# Patient Record
Sex: FEMALE | URBAN - NONMETROPOLITAN AREA
[De-identification: names, ages, dates, MRNs, and addresses within clinical notes are randomized per-mention and may not be internally consistent; named-entity substitution may affect disease eponyms.]

---

## 2021-03-03 ENCOUNTER — APPOINTMENT (OUTPATIENT)
Dept: URBAN - NONMETROPOLITAN AREA CLINIC 51 | Age: 71
Setting detail: DERMATOLOGY
End: 2021-03-03

## 2021-03-03 VITALS — HEIGHT: 65 IN | WEIGHT: 278 LBS

## 2021-03-03 DIAGNOSIS — D485 NEOPLASM OF UNCERTAIN BEHAVIOR OF SKIN: ICD-10-CM

## 2021-03-03 DIAGNOSIS — L82.1 OTHER SEBORRHEIC KERATOSIS: ICD-10-CM

## 2021-03-03 DIAGNOSIS — L72.8 OTHER FOLLICULAR CYSTS OF THE SKIN AND SUBCUTANEOUS TISSUE: ICD-10-CM

## 2021-03-03 DIAGNOSIS — L91.8 OTHER HYPERTROPHIC DISORDERS OF THE SKIN: ICD-10-CM

## 2021-03-03 DIAGNOSIS — Z85.828 PERSONAL HISTORY OF OTHER MALIGNANT NEOPLASM OF SKIN: ICD-10-CM

## 2021-03-03 DIAGNOSIS — D18.0 HEMANGIOMA: ICD-10-CM

## 2021-03-03 DIAGNOSIS — L30.9 DERMATITIS, UNSPECIFIED: ICD-10-CM

## 2021-03-03 DIAGNOSIS — L73.8 OTHER SPECIFIED FOLLICULAR DISORDERS: ICD-10-CM

## 2021-03-03 DIAGNOSIS — I78.8 OTHER DISEASES OF CAPILLARIES: ICD-10-CM

## 2021-03-03 PROBLEM — D18.01 HEMANGIOMA OF SKIN AND SUBCUTANEOUS TISSUE: Status: ACTIVE | Noted: 2021-03-03

## 2021-03-03 PROBLEM — D23.62 OTHER BENIGN NEOPLASM OF SKIN OF LEFT UPPER LIMB, INCLUDING SHOULDER: Status: ACTIVE | Noted: 2021-03-03

## 2021-03-03 PROBLEM — D48.5 NEOPLASM OF UNCERTAIN BEHAVIOR OF SKIN: Status: ACTIVE | Noted: 2021-03-03

## 2021-03-03 PROCEDURE — 99213 OFFICE O/P EST LOW 20 MIN: CPT

## 2021-03-03 PROCEDURE — OTHER PRESCRIPTION: OTHER

## 2021-03-03 PROCEDURE — OTHER OBSERVATION: OTHER

## 2021-03-03 PROCEDURE — OTHER COUNSELING: OTHER

## 2021-03-03 PROCEDURE — OTHER PATIENT SPECIFIC COUNSELING: OTHER

## 2021-03-03 RX ORDER — TRIAMCINOLONE ACETONIDE 1 MG/G
CREAM TOPICAL BID PRN
Qty: 1 | Refills: 0 | Status: ERX | COMMUNITY
Start: 2021-03-03

## 2021-03-03 ASSESSMENT — LOCATION SIMPLE DESCRIPTION DERM
LOCATION SIMPLE: RIGHT CHEEK
LOCATION SIMPLE: LEFT UPPER ARM
LOCATION SIMPLE: ABDOMEN
LOCATION SIMPLE: RIGHT FOREHEAD
LOCATION SIMPLE: LEFT PRETIBIAL REGION
LOCATION SIMPLE: RIGHT UPPER ARM
LOCATION SIMPLE: LEFT FOREARM
LOCATION SIMPLE: LEFT THIGH

## 2021-03-03 ASSESSMENT — LOCATION DETAILED DESCRIPTION DERM
LOCATION DETAILED: LEFT DISTAL DORSAL FOREARM
LOCATION DETAILED: RIGHT ANTERIOR DISTAL UPPER ARM
LOCATION DETAILED: LEFT DISTAL PRETIBIAL REGION
LOCATION DETAILED: LEFT LATERAL ABDOMEN
LOCATION DETAILED: RIGHT MEDIAL MALAR CHEEK
LOCATION DETAILED: RIGHT INFERIOR MEDIAL FOREHEAD
LOCATION DETAILED: LEFT ANTERIOR PROXIMAL THIGH
LOCATION DETAILED: PERIUMBILICAL SKIN
LOCATION DETAILED: LEFT ANTECUBITAL SKIN

## 2021-03-03 ASSESSMENT — LOCATION ZONE DERM
LOCATION ZONE: ARM
LOCATION ZONE: TRUNK
LOCATION ZONE: LEG
LOCATION ZONE: FACE

## 2021-03-03 NOTE — PROCEDURE: PATIENT SPECIFIC COUNSELING
She will avoid picking at the lesion.  Recheck at NOV; may need bx
Will treat empirically with topical steroid x up to 1 month.  Recheck at NOV
Detail Level: Simple

## 2021-04-21 ENCOUNTER — APPOINTMENT (OUTPATIENT)
Dept: URBAN - NONMETROPOLITAN AREA CLINIC 51 | Age: 71
Setting detail: DERMATOLOGY
End: 2021-04-21

## 2021-04-21 DIAGNOSIS — Z85.828 PERSONAL HISTORY OF OTHER MALIGNANT NEOPLASM OF SKIN: ICD-10-CM

## 2021-04-21 DIAGNOSIS — L30.9 DERMATITIS, UNSPECIFIED: ICD-10-CM

## 2021-04-21 DIAGNOSIS — D485 NEOPLASM OF UNCERTAIN BEHAVIOR OF SKIN: ICD-10-CM

## 2021-04-21 DIAGNOSIS — L65.8 OTHER SPECIFIED NONSCARRING HAIR LOSS: ICD-10-CM

## 2021-04-21 PROBLEM — D48.5 NEOPLASM OF UNCERTAIN BEHAVIOR OF SKIN: Status: ACTIVE | Noted: 2021-04-21

## 2021-04-21 PROCEDURE — OTHER COUNSELING: OTHER

## 2021-04-21 PROCEDURE — OTHER RECOMMENDATIONS: OTHER

## 2021-04-21 PROCEDURE — OTHER PATIENT SPECIFIC COUNSELING: OTHER

## 2021-04-21 PROCEDURE — OTHER OBSERVATION: OTHER

## 2021-04-21 PROCEDURE — 99213 OFFICE O/P EST LOW 20 MIN: CPT

## 2021-04-21 ASSESSMENT — SEVERITY ASSESSMENT: SEVERITY: CLEAR

## 2021-04-21 ASSESSMENT — LOCATION DETAILED DESCRIPTION DERM
LOCATION DETAILED: LEFT DISTAL DORSAL FOREARM
LOCATION DETAILED: MEDIAL FRONTAL SCALP
LOCATION DETAILED: RIGHT MEDIAL MALAR CHEEK
LOCATION DETAILED: LEFT ANTERIOR PROXIMAL THIGH
LOCATION DETAILED: LEFT DISTAL PRETIBIAL REGION

## 2021-04-21 ASSESSMENT — LOCATION SIMPLE DESCRIPTION DERM
LOCATION SIMPLE: LEFT FOREARM
LOCATION SIMPLE: LEFT THIGH
LOCATION SIMPLE: FRONTAL SCALP
LOCATION SIMPLE: RIGHT CHEEK
LOCATION SIMPLE: LEFT PRETIBIAL REGION

## 2021-04-21 ASSESSMENT — LOCATION ZONE DERM
LOCATION ZONE: FACE
LOCATION ZONE: LEG
LOCATION ZONE: SCALP
LOCATION ZONE: ARM

## 2021-04-21 NOTE — PROCEDURE: RECOMMENDATIONS
Recommendation Preamble: The following recommendations were made during the visit:
Render Risk Assessment In Note?: no
Detail Level: Zone
Recommendations (Free Text): Increase protein, Use Rogaine and take Biotin

## 2021-04-21 NOTE — PROCEDURE: COUNSELING
Detail Level: Detailed
Patient Specific Counseling (Will Not Stick From Patient To Patient): Likely SK

## 2022-03-10 ENCOUNTER — APPOINTMENT (OUTPATIENT)
Dept: URBAN - METROPOLITAN AREA CLINIC 234 | Age: 72
Setting detail: DERMATOLOGY
End: 2022-03-10

## 2022-03-10 DIAGNOSIS — L57.3 POIKILODERMA OF CIVATTE: ICD-10-CM

## 2022-03-10 DIAGNOSIS — L82.1 OTHER SEBORRHEIC KERATOSIS: ICD-10-CM

## 2022-03-10 DIAGNOSIS — L57.8 OTHER SKIN CHANGES DUE TO CHRONIC EXPOSURE TO NONIONIZING RADIATION: ICD-10-CM

## 2022-03-10 DIAGNOSIS — Z85.828 PERSONAL HISTORY OF OTHER MALIGNANT NEOPLASM OF SKIN: ICD-10-CM

## 2022-03-10 DIAGNOSIS — L72.0 EPIDERMAL CYST: ICD-10-CM

## 2022-03-10 PROCEDURE — OTHER COUNSELING: OTHER

## 2022-03-10 PROCEDURE — OTHER REASSURANCE: OTHER

## 2022-03-10 PROCEDURE — 99213 OFFICE O/P EST LOW 20 MIN: CPT

## 2022-03-10 ASSESSMENT — LOCATION DETAILED DESCRIPTION DERM
LOCATION DETAILED: RIGHT CENTRAL MALAR CHEEK
LOCATION DETAILED: RIGHT PROXIMAL DORSAL FOREARM
LOCATION DETAILED: LEFT LATERAL SUPERIOR CHEST
LOCATION DETAILED: LEFT PROXIMAL DORSAL FOREARM
LOCATION DETAILED: UPPER STERNUM
LOCATION DETAILED: RIGHT SUPERIOR CENTRAL MALAR CHEEK

## 2022-03-10 ASSESSMENT — LOCATION SIMPLE DESCRIPTION DERM
LOCATION SIMPLE: CHEST
LOCATION SIMPLE: RIGHT FOREARM
LOCATION SIMPLE: LEFT FOREARM
LOCATION SIMPLE: RIGHT CHEEK

## 2022-03-10 ASSESSMENT — LOCATION ZONE DERM
LOCATION ZONE: ARM
LOCATION ZONE: TRUNK
LOCATION ZONE: FACE

## 2023-04-19 ENCOUNTER — APPOINTMENT (OUTPATIENT)
Dept: URBAN - NONMETROPOLITAN AREA CLINIC 51 | Age: 73
Setting detail: DERMATOLOGY
End: 2023-04-24

## 2023-04-19 VITALS — WEIGHT: 283 LBS | HEIGHT: 66 IN

## 2023-04-19 DIAGNOSIS — L82.1 OTHER SEBORRHEIC KERATOSIS: ICD-10-CM

## 2023-04-19 DIAGNOSIS — L57.0 ACTINIC KERATOSIS: ICD-10-CM

## 2023-04-19 DIAGNOSIS — L72.0 EPIDERMAL CYST: ICD-10-CM

## 2023-04-19 DIAGNOSIS — L57.8 OTHER SKIN CHANGES DUE TO CHRONIC EXPOSURE TO NONIONIZING RADIATION: ICD-10-CM

## 2023-04-19 PROCEDURE — 17000 DESTRUCT PREMALG LESION: CPT

## 2023-04-19 PROCEDURE — OTHER COUNSELING: OTHER

## 2023-04-19 PROCEDURE — 99213 OFFICE O/P EST LOW 20 MIN: CPT | Mod: 25

## 2023-04-19 PROCEDURE — OTHER MIPS QUALITY: OTHER

## 2023-04-19 PROCEDURE — OTHER LIQUID NITROGEN: OTHER

## 2023-04-19 ASSESSMENT — LOCATION SIMPLE DESCRIPTION DERM
LOCATION SIMPLE: NOSE
LOCATION SIMPLE: RIGHT HAND
LOCATION SIMPLE: LEFT HAND
LOCATION SIMPLE: NOSE
LOCATION SIMPLE: CHEST
LOCATION SIMPLE: CHEST
LOCATION SIMPLE: LEFT HAND

## 2023-04-19 ASSESSMENT — LOCATION ZONE DERM
LOCATION ZONE: NOSE
LOCATION ZONE: TRUNK
LOCATION ZONE: HAND
LOCATION ZONE: NOSE
LOCATION ZONE: HAND
LOCATION ZONE: TRUNK

## 2023-04-19 ASSESSMENT — LOCATION DETAILED DESCRIPTION DERM
LOCATION DETAILED: LEFT LATERAL SUPERIOR CHEST
LOCATION DETAILED: LEFT LATERAL SUPERIOR CHEST
LOCATION DETAILED: LEFT RADIAL DORSAL HAND
LOCATION DETAILED: RIGHT ULNAR DORSAL HAND
LOCATION DETAILED: LEFT RADIAL DORSAL HAND
LOCATION DETAILED: NASAL DORSUM
LOCATION DETAILED: NASAL DORSUM

## 2023-04-19 NOTE — HPI: PREVENTATIVE SKIN CHECK
What Is The Reason For Today's Visit?: Full Body Skin Examination
Additional History: BCC REMOVED FROM RIGHT SIDE OF NOSE.

## 2024-05-01 ENCOUNTER — APPOINTMENT (OUTPATIENT)
Dept: URBAN - NONMETROPOLITAN AREA CLINIC 51 | Age: 74
Setting detail: DERMATOLOGY
End: 2024-05-02

## 2024-05-01 DIAGNOSIS — L82.0 INFLAMED SEBORRHEIC KERATOSIS: ICD-10-CM

## 2024-05-01 DIAGNOSIS — L82.1 OTHER SEBORRHEIC KERATOSIS: ICD-10-CM

## 2024-05-01 DIAGNOSIS — L73.8 OTHER SPECIFIED FOLLICULAR DISORDERS: ICD-10-CM

## 2024-05-01 DIAGNOSIS — L57.0 ACTINIC KERATOSIS: ICD-10-CM

## 2024-05-01 DIAGNOSIS — L57.8 OTHER SKIN CHANGES DUE TO CHRONIC EXPOSURE TO NONIONIZING RADIATION: ICD-10-CM

## 2024-05-01 DIAGNOSIS — D22 MELANOCYTIC NEVI: ICD-10-CM

## 2024-05-01 DIAGNOSIS — L81.4 OTHER MELANIN HYPERPIGMENTATION: ICD-10-CM

## 2024-05-01 DIAGNOSIS — D18.0 HEMANGIOMA: ICD-10-CM

## 2024-05-01 PROBLEM — D18.01 HEMANGIOMA OF SKIN AND SUBCUTANEOUS TISSUE: Status: ACTIVE | Noted: 2024-05-01

## 2024-05-01 PROBLEM — D22.9 MELANOCYTIC NEVI, UNSPECIFIED: Status: ACTIVE | Noted: 2024-05-01

## 2024-05-01 PROBLEM — D23.5 OTHER BENIGN NEOPLASM OF SKIN OF TRUNK: Status: ACTIVE | Noted: 2024-05-01

## 2024-05-01 PROCEDURE — 17000 DESTRUCT PREMALG LESION: CPT | Mod: 59

## 2024-05-01 PROCEDURE — OTHER LIQUID NITROGEN: OTHER

## 2024-05-01 PROCEDURE — 99213 OFFICE O/P EST LOW 20 MIN: CPT | Mod: 25

## 2024-05-01 PROCEDURE — 17110 DESTRUCT B9 LESION 1-14: CPT

## 2024-05-01 PROCEDURE — OTHER COUNSELING: OTHER

## 2024-05-01 PROCEDURE — OTHER SUNSCREEN RECOMMENDATIONS: OTHER

## 2024-05-01 PROCEDURE — 17003 DESTRUCT PREMALG LES 2-14: CPT | Mod: 59

## 2024-05-01 PROCEDURE — OTHER MIPS QUALITY: OTHER

## 2024-05-01 ASSESSMENT — LOCATION SIMPLE DESCRIPTION DERM
LOCATION SIMPLE: LEFT CHEEK
LOCATION SIMPLE: LEFT FOREHEAD
LOCATION SIMPLE: RIGHT HAND
LOCATION SIMPLE: RIGHT FOREHEAD

## 2024-05-01 ASSESSMENT — LOCATION DETAILED DESCRIPTION DERM
LOCATION DETAILED: LEFT INFERIOR CENTRAL MALAR CHEEK
LOCATION DETAILED: RIGHT RADIAL DORSAL HAND
LOCATION DETAILED: RIGHT ULNAR DORSAL HAND
LOCATION DETAILED: 3RD WEB SPACE RIGHT HAND
LOCATION DETAILED: RIGHT INFERIOR FOREHEAD
LOCATION DETAILED: LEFT INFERIOR FOREHEAD

## 2024-05-01 ASSESSMENT — LOCATION ZONE DERM
LOCATION ZONE: FACE
LOCATION ZONE: HAND
LOCATION ZONE: FACE

## 2024-05-01 NOTE — PROCEDURE: SUNSCREEN RECOMMENDATIONS

## 2024-05-01 NOTE — PROCEDURE: LIQUID NITROGEN
Medical Necessity Clause: This procedure was medically necessary because the lesions that were treated were:
Consent: The patient's consent was obtained including but not limited to risks of crusting, scabbing, blistering, scarring, darker or lighter pigmentary change, recurrence, incomplete removal and infection.
Spray Paint Technique: No
Spray Paint Text: The liquid nitrogen was applied to the skin utilizing a spray paint frosting technique.
Number Of Freeze-Thaw Cycles: 1 freeze-thaw cycle
Post-Care Instructions: I reviewed with the patient in detail post-care instructions. Patient is to wear sunprotection, and avoid picking at any of the treated lesions. Pt may apply Vaseline to crusted or scabbing areas.
Show Applicator Variable?: Yes
Duration Of Freeze Thaw-Cycle (Seconds): 10
Detail Level: Detailed
Medical Necessity Information: It is in your best interest to select a reason for this procedure from the list below. All of these items fulfill various CMS LCD requirements except the new and changing color options.

## 2024-10-07 ENCOUNTER — HOSPITAL ENCOUNTER (OUTPATIENT)
Dept: HOSPITAL 47 - LABPAT | Age: 74
Discharge: HOME | End: 2024-10-07
Attending: ORTHOPAEDIC SURGERY
Payer: MEDICARE

## 2024-10-07 LAB
ALBUMIN SERPL-MCNC: 4.5 G/DL (ref 3.8–4.9)
ALBUMIN/GLOB SERPL: 1.73 RATIO (ref 1.6–3.17)
ALP SERPL-CCNC: 69 U/L (ref 41–126)
ALT SERPL-CCNC: 28 U/L (ref 8–44)
ANION GAP SERPL CALC-SCNC: 10.7 MMOL/L (ref 4–12)
APTT BLD: 21.6 SEC (ref 22–30)
AST SERPL-CCNC: 24 U/L (ref 13–35)
BUN SERPL-SCNC: 18.3 MG/DL (ref 9–27)
BUN/CREAT SERPL: 26.14 RATIO (ref 12–20)
CALCIUM SPEC-MCNC: 9.3 MG/DL (ref 8.7–10.3)
CHLORIDE SERPL-SCNC: 107 MMOL/L (ref 96–109)
CO2 SERPL-SCNC: 25.3 MMOL/L (ref 21.6–31.8)
ERYTHROCYTE [DISTWIDTH] IN BLOOD BY AUTOMATED COUNT: 4.83 X 10*6/UL (ref 4.1–5.2)
ERYTHROCYTE [DISTWIDTH] IN BLOOD: 14 % (ref 11.5–14.5)
GLOBULIN SER CALC-MCNC: 2.6 G/DL (ref 1.6–3.3)
GLUCOSE SERPL-MCNC: 90 MG/DL (ref 70–110)
HCT VFR BLD AUTO: 44.3 % (ref 37.2–46.3)
HGB BLD-MCNC: 14.2 G/DL (ref 12–15)
INR PPP: 1 (ref ?–1.2)
MCH RBC QN AUTO: 29.4 PG (ref 27–32)
MCHC RBC AUTO-ENTMCNC: 32.1 G/DL (ref 32–37)
MCV RBC AUTO: 91.7 FL (ref 80–97)
NRBC BLD AUTO-RTO: 0 X 10*3/UL (ref 0–0.01)
PLATELET # BLD AUTO: 194 X 10*3/UL (ref 140–440)
POTASSIUM SERPL-SCNC: 4.4 MMOL/L (ref 3.5–5.5)
PROT SERPL-MCNC: 7.1 G/DL (ref 6.2–8.2)
PT BLD: 10.9 SEC (ref 10–12.5)
SODIUM SERPL-SCNC: 143 MMOL/L (ref 135–145)
WBC # BLD AUTO: 6.29 X 10*3/UL (ref 4.5–10)

## 2024-10-07 PROCEDURE — 85027 COMPLETE CBC AUTOMATED: CPT

## 2024-10-07 PROCEDURE — 86900 BLOOD TYPING SEROLOGIC ABO: CPT

## 2024-10-07 PROCEDURE — 85730 THROMBOPLASTIN TIME PARTIAL: CPT

## 2024-10-07 PROCEDURE — 80053 COMPREHEN METABOLIC PANEL: CPT

## 2024-10-07 PROCEDURE — 86850 RBC ANTIBODY SCREEN: CPT

## 2024-10-07 PROCEDURE — 83036 HEMOGLOBIN GLYCOSYLATED A1C: CPT

## 2024-10-07 PROCEDURE — 87070 CULTURE OTHR SPECIMN AEROBIC: CPT

## 2024-10-07 PROCEDURE — 85610 PROTHROMBIN TIME: CPT

## 2024-10-07 PROCEDURE — 36415 COLL VENOUS BLD VENIPUNCTURE: CPT

## 2024-10-07 PROCEDURE — 86901 BLOOD TYPING SEROLOGIC RH(D): CPT

## 2024-10-16 ENCOUNTER — HOSPITAL ENCOUNTER (INPATIENT)
Dept: HOSPITAL 47 - OR | Age: 74
LOS: 5 days | Discharge: NURSING, REHAB OR LONG-TERM CARE | DRG: 470 | End: 2024-10-21
Attending: ORTHOPAEDIC SURGERY | Admitting: ORTHOPAEDIC SURGERY
Payer: MEDICARE

## 2024-10-16 VITALS — BODY MASS INDEX: 45.1 KG/M2

## 2024-10-16 DIAGNOSIS — Z85.3: ICD-10-CM

## 2024-10-16 DIAGNOSIS — E78.5: ICD-10-CM

## 2024-10-16 DIAGNOSIS — E11.9: ICD-10-CM

## 2024-10-16 DIAGNOSIS — R09.02: ICD-10-CM

## 2024-10-16 DIAGNOSIS — E66.01: ICD-10-CM

## 2024-10-16 DIAGNOSIS — H91.90: ICD-10-CM

## 2024-10-16 DIAGNOSIS — K21.9: ICD-10-CM

## 2024-10-16 DIAGNOSIS — M16.11: Primary | ICD-10-CM

## 2024-10-16 DIAGNOSIS — I10: ICD-10-CM

## 2024-10-16 LAB
GLUCOSE BLD-MCNC: 112 MG/DL (ref 70–110)
GLUCOSE BLD-MCNC: 189 MG/DL (ref 70–110)
GLUCOSE BLD-MCNC: 207 MG/DL (ref 70–110)
GLUCOSE BLD-MCNC: 211 MG/DL (ref 70–110)

## 2024-10-16 PROCEDURE — 84132 ASSAY OF SERUM POTASSIUM: CPT

## 2024-10-16 PROCEDURE — 73501 X-RAY EXAM HIP UNI 1 VIEW: CPT

## 2024-10-16 PROCEDURE — 64447 NJX AA&/STRD FEMORAL NRV IMG: CPT

## 2024-10-16 PROCEDURE — 71045 X-RAY EXAM CHEST 1 VIEW: CPT

## 2024-10-16 PROCEDURE — 83036 HEMOGLOBIN GLYCOSYLATED A1C: CPT

## 2024-10-16 PROCEDURE — 80048 BASIC METABOLIC PNL TOTAL CA: CPT

## 2024-10-16 PROCEDURE — 85025 COMPLETE CBC W/AUTO DIFF WBC: CPT

## 2024-10-16 PROCEDURE — 0SR903A REPLACEMENT OF RIGHT HIP JOINT WITH CERAMIC SYNTHETIC SUBSTITUTE, UNCEMENTED, OPEN APPROACH: ICD-10-PCS

## 2024-10-16 RX ADMIN — Medication PRN ML: at 13:17

## 2024-10-16 RX ADMIN — CEFAZOLIN SCH: 330 INJECTION, POWDER, FOR SOLUTION INTRAMUSCULAR; INTRAVENOUS at 17:05

## 2024-10-16 RX ADMIN — TIMOLOL MALEATE SCH DROPS: 5 SOLUTION OPHTHALMIC at 23:38

## 2024-10-16 RX ADMIN — POTASSIUM CHLORIDE ONE MLS: 14.9 INJECTION, SOLUTION INTRAVENOUS at 16:02

## 2024-10-16 RX ADMIN — ASPIRIN 81 MG CHEWABLE TABLET SCH MG: 81 TABLET CHEWABLE at 21:00

## 2024-10-16 RX ADMIN — Medication SCH MG: at 21:00

## 2024-10-16 RX ADMIN — DEXTROSE PRN MG: 50 INJECTION, SOLUTION INTRAVENOUS at 11:26

## 2024-10-16 RX ADMIN — OXYCODONE HYDROCHLORIDE PRN MG: 10 TABLET, FILM COATED, EXTENDED RELEASE ORAL at 11:22

## 2024-10-16 RX ADMIN — ACETAMINOPHEN PRN MG: 500 TABLET ORAL at 11:21

## 2024-10-16 RX ADMIN — KETOROLAC TROMETHAMINE PRN MG: 15 INJECTION, SOLUTION INTRAMUSCULAR; INTRAVENOUS at 11:26

## 2024-10-16 RX ADMIN — HYDROCODONE BITARTRATE AND ACETAMINOPHEN PRN EACH: 10; 325 TABLET ORAL at 17:44

## 2024-10-16 RX ADMIN — CYCLOBENZAPRINE HYDROCHLORIDE PRN MG: 10 TABLET, FILM COATED ORAL at 21:00

## 2024-10-16 RX ADMIN — POTASSIUM CHLORIDE SCH MLS/HR: 14.9 INJECTION, SOLUTION INTRAVENOUS at 11:26

## 2024-10-16 RX ADMIN — ATORVASTATIN CALCIUM SCH MG: 20 TABLET, FILM COATED ORAL at 20:59

## 2024-10-16 RX ADMIN — DOCUSATE SODIUM PRN MG: 100 CAPSULE, LIQUID FILLED ORAL at 11:22

## 2024-10-16 RX ADMIN — FAMOTIDINE PRN MG: 10 INJECTION, SOLUTION INTRAVENOUS at 11:25

## 2024-10-16 RX ADMIN — POTASSIUM CHLORIDE ONE MLS: 14.9 INJECTION, SOLUTION INTRAVENOUS at 11:46

## 2024-10-16 RX ADMIN — ASPIRIN 81 MG CHEWABLE TABLET SCH: 81 TABLET CHEWABLE at 21:02

## 2024-10-16 RX ADMIN — DOCUSATE SODIUM AND SENNOSIDES SCH: 50; 8.6 TABLET ORAL at 20:59

## 2024-10-16 RX ADMIN — MIDAZOLAM ONE MG: 1 INJECTION INTRAMUSCULAR; INTRAVENOUS at 11:50

## 2024-10-16 RX ADMIN — ONDANSETRON ONE MG: 2 INJECTION INTRAMUSCULAR; INTRAVENOUS at 11:25

## 2024-10-16 RX ADMIN — INSULIN ASPART SCH UNIT: 100 INJECTION, SOLUTION INTRAVENOUS; SUBCUTANEOUS at 17:32

## 2024-10-16 RX ADMIN — HYDROMORPHONE HYDROCHLORIDE PRN MG: 1 INJECTION, SOLUTION INTRAMUSCULAR; INTRAVENOUS; SUBCUTANEOUS at 15:13

## 2024-10-16 NOTE — P.ANPRN
Procedure Note - Anesthesia





- Nerve Block Performed


  ** Right Paul Single


Time Out Performed: Yes


Date of Procedure: 10/16/24


Procedure Start Time: 11:49


Procedure Stop Time: 11:54


Location of Patient: PreOp


Indication: Acute Post-Operative Pain, Analgesia, Requested by Surgeon


Sedation Type: Sedate with meaningful contact maintained


Preparation: Sterile Prep


Position: Supine


Catheter: None


Needle Types: Pajunk


Needle Gauge: 21


Ultrasound used to visualize needle placement: Yes


Ultrasound used to observe medication spread: Yes


Injectate: 0.5% Ropivacaine (see comment for volume) (Ropiv 20ml+Decadron 4mg. 

attemptX1.)


Blood Aspirated: No


Pain Paresthesia on Injection Noted: No


Resistance on Injection: Normal


Image Stored and Saved: Yes


Events: Uneventful and Well Tolerated

## 2024-10-16 NOTE — FL
EXAMINATION TYPE: FL guidance operating room, XR Hip Limited RT

 

DATE OF EXAM: 10/16/2024 2:32 PM

 

COMPARISON: Pre Operative Images if available both CT/MRI or plain film 

 

CLINICAL INDICATION: Female, 74 years old with history of Rt Hip-Ant;

 

TECHNIQUE: FL guidance operating room, XR Hip Limited RT, multiple fluoroscopic images provided for p
rocedure.

 

Total fluoroscopy time: 40.1 seconds 

Total submitted images to PACS: 9 

DAP: 8.4640 mGym2 Gycm2 uGym2 cGycm2 or equivalent.

 

FINDINGS:

Fluoroscopic images during internal fixation/arthroplasty demonstrate fixation hardware in appropriat
e position. Hardware appears intact. No immediate complication identified.

 

 

IMPRESSION:

1.  No evidence for intraoperative complication.

2.  Please see the operative/procedural note for further details.

 

[facility], Workstation: [id number], 10/16/2024 3:17 PM

## 2024-10-16 NOTE — P.OP
Date of Procedure: 10/16/24


Preoperative Diagnosis: 


1. Severe right hip osteoarthritis


2. BMI 44.8


Postoperative Diagnosis: 


same


Procedure(s) Performed: 


1. Right direct anterior total hip arthroplasty 


2. Application of negative pressure incisional wound VAC right hip, incision 

measuring 15 cm, less than 50 cm


Implants: 


1. Randolph Trident II Acetabular Cup, Size #52


2. Sandeep Insignia Size # 6  Femoral Stem, High Offset


3. Dual Mobility OD 42 mm, ID 28 mm, -4 mm neck


Anesthesia: RENETTA, regional


Surgeon: Frank Dunne


Assistant #1: Nilson Corey


Estimated Blood Loss (ml): 300


IV fluids (ml): 1,000


Pathology: none sent


Condition: stable


Disposition: PACU


Indications for Procedure: 


patient is a very pleasant 74-year-old female with a medical history significant

for having a BMI of over 40 who presented to my office with severe right hip and

groin pain.  Her x-rays showed moderate arthritis.  She was sent for a 

diagnostic right hip injection under fluoroscopy and had almost complete 

resolution of her symptoms.  She also has a history of lumbar spine degenerative

disease.  Due to her response to a diagnostic intra-articular right hip 

injection, her physical exam with severe pain with any attempts at passive range

of motion her x-ray findings her hip pain seems to be mostly coming from 

arthritis in the hip joint.  The patient requested proceeding with a total hip 

replacement.  She understands that she may still have discomfort if some of her 

symptoms are related to her lumbar spine.





I had a long discussion with the patient in the office on the potential risks 

and complications of an elective total hip replacement through a direct anterior

approach.  Risks discussed include, but are certainly not limited to, risks from

anesthesia, superficial infection requiring local wound care or antibiotics, 

deep luz-prosthetic joint infection and the treatment required to eradicate 

infection, intraoperative fracture, postoperative periprosthetic fracture, 

damage to local blood vessels or nerves particularly the lateral femoral 

cutaneous nerve, delayed wound healing requiring local wound care or possibly 

surgical debridement, hip dislocation, leg length discrepancy, soft tissue 

irritation around the total hip implant such as iliopsoas tendinitis or 

trochanteric bursitis, wear and osteolysis from the implants, squeaking or 

audible noises, groin pain, thigh pain, heterotopic ossification, stiffness, as

eptic loosening of the implants, dissatisfaction with surgical outcome, need for

revision surgery, DVT, PE, swelling of the operative extremity, acute coronary 

event, stroke, failure to thrive, and possibly loss of life or limb.  The 

patient understands that while these are the most common complications after an 

elective hip replacement there are certainly other less common complications 

possible.  They were given ample time to ask questions regarding the potential 

complications of a hip replacement.  Following our discussion the patient 

provided their verbal and written consent to go forward with an elective total 

hip replacement.


Operative Findings: 


Severe right hip osteoarthritis with complete cartilage loss in the superior 

femoral head and diffuse cartilage loss of the acetabulum.


Description of Procedure: 


The patient was identified in the preoperative holding area and the correct hip 

was marked with my initials.  I reviewed the procedure and consent with the 

patient.  All of their questions were answered.  The patient was then brought 

back into the operating room by anesthesia.  While on the [facility] anesthesia was 

administered by the anesthesia team.  Preoperative antibiotics and tranexamic 

acid were also given.  After the patient was under anesthesia I examined their 

ankles to determine their preoperative leg length discrepancy.  The skin over 

the anterior aspect of the hip was shaved to remove hair over the site of 

planned incision.  Both feet and ankles were padded with webril and boots for 

the Clemons were applied.  The patient was then carefully transferred onto the Clemons

table.  A perineal post was immediately placed.  The arms were placed on arm 

holders and were well-padded.  Both boots were secured to the spars on the Clemons 

table.  The patient was positioned so that the pelvis was centered over the 

post.  Nonsterile drapes were applied.  A timeout was performed identifying the 

correct patient, operative extremity, and procedure.  At this point fluoroscopy 

was brought in to take preoperative images of the pelvis and operative hip.  

Using the standing AP pelvis from the office as a template, a comparable image 

was obtained with fluoroscopy.  A metallic bar was used to create a bi-ischial 

line for use as a reference to leg length adjustments during the procedure.  

Global offset was also measured on both the operative and nonoperative leg.  

Fluoroscopy was then brought out and a pre-scrub using a chlorhexidine scrub 

brush was performed.  The operative limb was then prepped and draped in the 

standard sterile fashion.





An anterior longitudinal incision was made lateral and distal to the ASIS.  The 

skin and subcutaneous tissues were incised sharply.  The underlying tensor 

fascia was identified and incised in its midportion.  The fascia was dissected 

free from the underlying muscle and the muscle belly was retracted.  A blunt 

tipped cobra retractor was placed over the superior neck under the muscle fibers

of the gluteus minimus.  The deep enveloping fascia of the tensor was incised.  

The anterior leash of vessels were then identified and cauterized.  The fascia 

between the rectus and the capsule was then incised and the pre-capsular fat was

excised.  A second Cobra was placed inferior to the neck.  The interval between 

the rectus and iliocapsularis and the hip capsule was developed and a retractor 

was placed carefully over the anterior rim of the acetabulum.  A T-shaped 

anterior capsulotomy was performed.  The superior capsular leaflet was left in 

place in the inferior capsular flap was excised.  The Cobra retractors were 

placed intracapsularly.  We then made a femoral neck osteotomy according to 

preoperative and intraoperative templating and confirmed the level of the 

osteotomy using fluoroscopic imaging.  The femoral head was removed, passed off 

to the back table, and sized.  The superior capsular flap was excised.  

Retractors were placed circumferentially exposing the acetabulum.  We then 

circumferentially debrided the acetabulum free of labrum and osteophytes.  The 

pulvinar was removed to fully visualize the cotyloid fossa.  We then 

sequentially reamed to achieve peripheral fit and excellent bleeding subchondral

bone.  The socket was thoroughly irrigated.  The acetabular component was 

impacted into the appropriate position using fluoroscopy to guide version, 

inclination, and depth of insertion taking care to have a comparable image of 

the AP pelvis to the standing image taken in the office.  An excellent press-fit

was achieved and final position was confirmed using fluoroscopy.  The press fit 

was augmented with bony cancellus dome screws.  The liner was then impacted into

the socket.





Attention was then turned to the femur.  The remnant dorsal lateral capsule was 

excised.  The short external rotators were visible and protected.  A bone hook 

was used to confirm appropriate translation of the trochanter away from the 

acetabulum.  The leg was then extended and adducted and the bone hook was used 

to elevate the femur for broaching.  A box osteotome and blunt tipped canal 

sound was then utilized to gain access to the femoral canal.  We then 

sequentially broached the femur in appropriate anteversion until excellent 

torsional stability was achieved.  The neck cut was brought flush to the trial 

broach with a calcar planar.  A trial neck and head were then placed onto the 

broach and the hip was atraumatically reduced under direct visualization.  

External rotation to 90 was performed to assess stability.  Fluoroscopy was 

brought in.  An AP and lateral fluoroscopic image of the proximal femur was 

obtained to assess position and fill of the trial broach.  An AP of the pelvis 

was then obtained and matched to the preoperative image taken. A bi-ischial bar 

was then placed and measurements were taken to assess changes in length and 

offset.  The hip was then carefully dislocated, the proximal femur was exposed, 

and the trial implants were removed.  The wound and proximal femur was 

thoroughly irrigated using sterile saline and pulsatile lavage.  The final 

femoral implant was dispensed and gently tapped into place generating an 

excellent press-fit.  The trunnion was cleansed and the final head was tapped 

into place to engage the Kaur taper.  The acetabulum was irrigated and 

visualized to be free of debris.  The hip was carefully reduced.  Stability was 

checked clinically with external rotation to 90 and there was no evidence of 

instability.  Final fluoroscopic images were taken.  The wound was then 

thoroughly irrigated and soaked with a dilute Betadine rinse for 3 minutes.  3 L

of sterile saline was irrigated through the wound using pulsatile lavage.  Local

anesthetic  cocktail was injected into the soft tissues around the surgical f

ield.   The wound was then closed in layers. An incisional wound VAC was placed 

over the closed incision to help lower the risk of delayed wound healing and 

surgical site infection. After connecting the wound VAC to it's power source, 

there was an excellent seal.  The drapes were taken down and the patient was 

carefully transferred off of the Clemons table.  Following removal of the  boots 

the leg lengths felt acceptable.  The patient was then taken to recovery room 

having tolerated the procedure well.





Nilson Corey PA-C was required as a skilled assistant due to the complexity 

of the procedure for patient positioning, draping, exposure, retraction, closure

of wound and application of dressing. 


.





PLAN: The patient can weight-bear as tolerated on the operative extremity.  2 

doses of postoperative antibiotics.  DVT prophylaxis with aspirin 81 mg twice a 

day based on preoperative risk stratification.  Physical therapy for gait 

training.

## 2024-10-17 LAB
BASOPHILS # BLD AUTO: 0.01 X 10*3/UL (ref 0–0.1)
BASOPHILS NFR BLD AUTO: 0.1 %
EOSINOPHIL # BLD AUTO: 0.01 X 10*3/UL (ref 0.04–0.35)
EOSINOPHIL NFR BLD AUTO: 0.1 %
ERYTHROCYTE [DISTWIDTH] IN BLOOD BY AUTOMATED COUNT: 3.82 X 10*6/UL (ref 4.1–5.2)
ERYTHROCYTE [DISTWIDTH] IN BLOOD: 14.2 % (ref 11.5–14.5)
GLUCOSE BLD-MCNC: 124 MG/DL (ref 70–110)
GLUCOSE BLD-MCNC: 142 MG/DL (ref 70–110)
GLUCOSE BLD-MCNC: 156 MG/DL (ref 70–110)
GLUCOSE BLD-MCNC: 169 MG/DL (ref 70–110)
HCT VFR BLD AUTO: 34.6 % (ref 37.2–46.3)
HGB BLD-MCNC: 11.5 G/DL (ref 12–15)
IMM GRANULOCYTES BLD QL AUTO: 0.5 %
LYMPHOCYTES # SPEC AUTO: 0.82 X 10*3/UL (ref 0.9–5)
LYMPHOCYTES NFR SPEC AUTO: 8.4 %
MCH RBC QN AUTO: 30.1 PG (ref 27–32)
MCHC RBC AUTO-ENTMCNC: 33.2 G/DL (ref 32–37)
MCV RBC AUTO: 90.6 FL (ref 80–97)
MONOCYTES # BLD AUTO: 0.73 X 10*3/UL (ref 0.2–1)
MONOCYTES NFR BLD AUTO: 7.5 %
NEUTROPHILS # BLD AUTO: 8.13 X 10*3/UL (ref 1.8–7.7)
NEUTROPHILS NFR BLD AUTO: 83.4 %
NRBC BLD AUTO-RTO: 0 X 10*3/UL (ref 0–0.01)
PLATELET # BLD AUTO: 157 X 10*3/UL (ref 140–440)
WBC # BLD AUTO: 9.75 X 10*3/UL (ref 4.5–10)

## 2024-10-17 RX ADMIN — CHOLECALCIFEROL TAB 125 MCG (5000 UNIT) SCH MCG: 125 TAB at 09:25

## 2024-10-17 RX ADMIN — Medication SCH MG: at 09:25

## 2024-10-17 RX ADMIN — OXYCODONE HYDROCHLORIDE AND ACETAMINOPHEN SCH MG: 500 TABLET ORAL at 09:25

## 2024-10-17 RX ADMIN — POTASSIUM CHLORIDE SCH MEQ: 750 TABLET, EXTENDED RELEASE ORAL at 09:24

## 2024-10-17 RX ADMIN — LOSARTAN POTASSIUM SCH MG: 50 TABLET, FILM COATED ORAL at 09:24

## 2024-10-17 RX ADMIN — CYANOCOBALAMIN TAB 500 MCG SCH MCG: 500 TAB at 09:25

## 2024-10-17 RX ADMIN — Medication SCH EACH: at 09:25

## 2024-10-17 RX ADMIN — THERA TABS SCH EACH: TAB at 09:24

## 2024-10-17 RX ADMIN — HYDROMORPHONE HYDROCHLORIDE PRN MG: 1 INJECTION, SOLUTION INTRAMUSCULAR; INTRAVENOUS; SUBCUTANEOUS at 04:21

## 2024-10-17 RX ADMIN — PANTOPRAZOLE SODIUM SCH MG: 40 TABLET, DELAYED RELEASE ORAL at 09:24

## 2024-10-17 NOTE — P.PN
Subjective


Progress Note Date: 10/17/24


Principal diagnosis: 


Status post right direct anterior total hip arthroplasty for right hip 

osteoarthritis on 10/16/2024 by Dr. Dunne





No acute events overnight.  Patient was examined at bedside this morning.  

Patient states they have mild pain in the right thigh.  Patient has been up to 

the bathroom multiple times, and up to the chair.  Patient has had difficulty 

ambulating up out of bed and has required assistance.








Objective





- Vital Signs


Vital signs: 


                                   Vital Signs











Temp  97.7 F   10/17/24 07:21


 


Pulse  73   10/17/24 07:21


 


Resp  18   10/17/24 07:21


 


BP  121/70   10/17/24 07:21


 


Pulse Ox  93 L  10/17/24 07:21


 


FiO2      








                                 Intake & Output











 10/16/24 10/17/24 10/17/24





 18:59 06:59 18:59


 


Intake Total 1200  


 


Output Total 300  


 


Balance 900  


 


Weight 125.8 kg  


 


Intake:   


 


  IV 1200  


 


Output:   


 


  Estimated Blood Loss 300  


 


Other:   


 


  Voiding Method  Toilet 


 


  # Voids  2 














- Exam


Patient is resting comfortably in a chair.  No apparent distress.  They are 

awake, alert and able to answer questions.  On inspection the surgical hip has a

wound VAC, it is intact, there is no drainage or strikethrough.  The skin 

surrounding the VAC is free of erythema.  There is mild swelling in the 

operative thigh.  Operative femoral nerve function is intact.  The patient is 

able to actively plantarflex and dorsiflex their operative ankle and toes.  Thei

r operative foot is pink and warm to touch.








- Labs


CBC & Chem 7: 


                                 10/16/24 11:35


Labs: 


                  Abnormal Lab Results - Last 24 Hours (Table)











  10/16/24 10/16/24 10/16/24 Range/Units





  11:18 15:54 16:24 


 


POC Glucose (mg/dL)  112 H  189 H  211 H  ()  mg/dL














  10/16/24 10/17/24 Range/Units





  20:30 06:41 


 


POC Glucose (mg/dL)  207 H  142 H  ()  mg/dL














Assessment and Plan


Assessment: 


Postop day #1 status post right direct anterior total hip arthroplasty for right

hip osteoarthritis on 10/16/2024 by Dr. Dunne





Plan: 


Weight-bear as tolerated with walker on the operative extremity.


Leave surgical wound VAC. 


Continue to manage pain.


Patient has had difficulty ambulating today, and has required assistance to get 

out of bed, plan to keep tonight in hospital, will see how patient does tomorrow

and make dispo plan then.


We appreciate medicine for medical management.

## 2024-10-17 NOTE — CONS
CONSULTATION



HISTORY OF PRESENT ILLNESS:

A 74-year-old white female, history of hypertension, obesity, GERD, dyslipidemia,

diabetes mellitus, breast cancer, deafness, was admitted for surgery for her right hip.

She has some pain lifting her right leg out of bed.  Otherwise, she is doing fine. She

has been up to the bathroom already x1.



ALLERGIES:

Levaquin.



PAST SURGICAL HISTORY:

Cholecystectomy, hernia, bilateral knees, breast carcinoma, cyst on the liver.



FAMILY HISTORY:

Hypertension in the family. Never smoked.  Not pregnant.



REVIEW OF SYSTEMS:

A 14-point review of systems otherwise negative.



PHYSICAL EXAMINATION:

VITAL SIGNS: Blood pressure 102/85.

GENERAL: Alert, and oriented x3.

HEENT:  Normocephalic, atraumatic.

NECK:  Supple.

CARDIAC:  Regular rate and rhythm.

LUNGS:  Clear.

ABDOMEN:  Soft, nontender.

EXTREMITIES: Right hip has a bandage on it. Some mild swelling and firmness below the

wound.



Continue current treatment.  No chest pain or shortness of breath.  Follow up in the

next 24 to 48 hours.





MMODL / IJN: [id number] / Job#: [id number]

## 2024-10-18 LAB
GLUCOSE BLD-MCNC: 140 MG/DL (ref 70–110)
GLUCOSE BLD-MCNC: 146 MG/DL (ref 70–110)
GLUCOSE BLD-MCNC: 208 MG/DL (ref 70–110)
GLUCOSE BLD-MCNC: 241 MG/DL (ref 70–110)

## 2024-10-18 NOTE — XR
EXAMINATION TYPE: XR chest 1V

 

DATE OF EXAM: 10/18/2024

 

COMPARISON: NONE

 

HISTORY: Hypoxia

 

TECHNIQUE: Single frontal view of the chest is obtained.

 

FINDINGS:  There is no focal air space opacity, pleural effusion, or pneumothorax seen.  The cardiac 
silhouette size is within normal limits.   The osseous structures are intact.

 

IMPRESSION:  No acute process.

 

[facility], Workstation: [facility], 10/18/2024 11:21 AM

## 2024-10-18 NOTE — P.PN
Subjective


Progress Note Date: 10/18/24


Patient is evaluated in follow-up on the medical floor she is postoperative day 

#1 right hip total arthroplasty. Patient was noted to have episodes of hypoxia 

with saturations of 92 to 93% chest x-ray was completed which is negative for 

any acute process and patient states that she is using her incentive spirometer 

at the bedside.  Acute complaints at this time and is currently pending 

discharge to subacute rehab which will happen on Monday as she does require 3 

nights of inpatient hospital stay.





Review of Systems





Constitutional: Denied any fatigue denied any fever.


Cardio vascular: denied any chest pain, palpitations


Gastrointestinal: denied any nausea, vomiting, diarrhea


Pulmonary: Denied any shortness of breath cough


Neurologic denied any new focal deficits





All inpatient medications were reviewed and appropriate changes in these 

medications as dictated in the interval history and assessment and plan.





PHYSICAL EXAMINATION: 





GENERAL: The patient is alert and oriented x3, not in any acute distress. Well 

developed, well nourished. 


HEENT: Pupils are round and equally reacting to light. EOMI. No scleral icterus.

No conjunctival pallor. Normocephalic, atraumatic. No pharyngeal erythema. No 

thyromegaly. 


CARDIOVASCULAR: S1 and S2 present. No murmurs, rubs, or gallops. 


PULMONARY: Chest is clear to auscultation, no wheezing or crackles. 


ABDOMEN: Soft, nontender, nondistended, normoactive bowel sounds. No palpable 

organomegaly. 


MUSCULOSKELETAL: No joint swelling or deformity.


EXTREMITIES: No cyanosis, clubbing, or pedal edema. 


NEUROLOGICAL: Gross neurological examination did not reveal any focal deficits. 


SKIN: No rashes. 





Assessment


Postoperative day 1 right hip total arthroplasty


Hypertension


Diabetes mellitus type 2


Gastroesophageal reflux disease


Dyslipidemia


History of breast insert


GI prophylaxis


DVT prophylaxis as per primary


Full code





Continue all same home medications continue to encourage incentive spirometer 10

times an hour while awake.  Patient is currently pending discharge to subacute 

rehab as mentioned she does require 3 days of inpatient hospital stay and 

discharge will be on Monday.





The impression and plan of care has been dictated by Rin Avelar, Nurse 

Practitioner as directed.





Dr. Chapito MD


I have performed a history and physical examination and medical decision making 

of this patient, discussed the same with the dictator, and agree with the 

dictators assessment and plan as written, documented as a scribe. Based on total

visit time, I have performed more than 50% of this visit.








Objective





- Vital Signs


Vital signs: 


                                   Vital Signs











Temp  99.6 F   10/18/24 08:33


 


Pulse  84   10/18/24 08:33


 


Resp  20   10/18/24 08:33


 


BP  117/72   10/18/24 08:33


 


Pulse Ox  92 L  10/18/24 08:33


 


FiO2      








                                 Intake & Output











 10/17/24 10/18/24 10/18/24





 18:59 06:59 18:59


 


Output Total  1200 


 


Balance  -1200 


 


Output:   


 


  Urine  1200 


 


Other:   


 


  # Voids 3 1 1














- Labs


CBC & Chem 7: 


                                 10/17/24 04:04





                                 10/16/24 11:35


Labs: 


                  Abnormal Lab Results - Last 24 Hours (Table)











  10/17/24 10/17/24 10/17/24 Range/Units





  04:04 12:14 16:29 


 


POC Glucose (mg/dL)   156 H  124 H  ()  mg/dL


 


Hemoglobin A1c  6.2 H    (<=6.0)  %














  10/17/24 10/18/24 Range/Units





  20:16 06:28 


 


POC Glucose (mg/dL)  169 H  140 H  ()  mg/dL


 


Hemoglobin A1c    (<=6.0)  %














Assessment and Plan


Time with Patient: Less than 30

## 2024-10-18 NOTE — P.PN
Subjective


Progress Note Date: 10/18/24


Principal diagnosis: 


Status post right direct anterior total hip arthroplasty for right hip 

osteoarthritis on 10/16/2024 by Dr. Dunne





No acute events overnight.  Patient was examined at bedside this morning.  

Patient states they have mild pain in the right thigh.  Patient has been up to 

the bathroom multiple times, and up to the chair.  Patient worked with physical 

therapy and it is uncertain if she will qualify for short acute rehab.  Patient 

has had difficulty ambulating up out of bed and has required assistance.  








Objective





- Vital Signs


Vital signs: 


                                   Vital Signs











Temp  99.6 F   10/18/24 08:33


 


Pulse  84   10/18/24 08:33


 


Resp  20   10/18/24 08:33


 


BP  117/72   10/18/24 08:33


 


Pulse Ox  92 L  10/18/24 08:33


 


FiO2      








                                 Intake & Output











 10/17/24 10/18/24 10/18/24





 18:59 06:59 18:59


 


Output Total  1200 


 


Balance  -1200 


 


Output:   


 


  Urine  1200 


 


Other:   


 


  # Voids 3 1 1














- Exam


Patient is resting comfortably in a chair.  No apparent distress.  They are 

awake, alert and able to answer questions.  On inspection the surgical hip has a

wound VAC, it is intact, there is no drainage or strikethrough.  The skin 

surrounding the VAC is free of erythema.  There is mild swelling in the 

operative thigh.  Operative femoral nerve function is intact.  The patient is 

able to actively plantarflex and dorsiflex their operative ankle and toes.  

Their operative foot is pink and warm to touch.








- Labs


CBC & Chem 7: 


                                 10/17/24 04:04





                                 10/16/24 11:35


Labs: 


                  Abnormal Lab Results - Last 24 Hours (Table)











  10/17/24 10/17/24 10/17/24 Range/Units





  04:04 12:14 16:29 


 


POC Glucose (mg/dL)   156 H  124 H  ()  mg/dL


 


Hemoglobin A1c  6.2 H    (<=6.0)  %














  10/17/24 10/18/24 Range/Units





  20:16 06:28 


 


POC Glucose (mg/dL)  169 H  140 H  ()  mg/dL


 


Hemoglobin A1c    (<=6.0)  %














Assessment and Plan


Assessment: 


Postop day #2 status post right direct anterior total hip arthroplasty for right

hip osteoarthritis on 10/16/2024 by Dr. Dunne





Plan: 


Weight-bear as tolerated with walker on the operative extremity.


Leave surgical wound VAC. 


Continue to manage pain.


From from discussing case with nursing staff and , it is uncertain 

if patient will qualify for short acute rehab.


Will have patient work with physical therapy again today.  


Plan to keep tonight in hospital, will see how patient does tomorrow and make 

further dispo plan then.


We appreciate medicine for medical management.

## 2024-10-19 LAB
BASOPHILS # BLD AUTO: 0.06 X 10*3/UL (ref 0–0.1)
BASOPHILS NFR BLD AUTO: 0.7 %
EOSINOPHIL # BLD AUTO: 0.1 X 10*3/UL (ref 0.04–0.35)
EOSINOPHIL NFR BLD AUTO: 1.1 %
ERYTHROCYTE [DISTWIDTH] IN BLOOD BY AUTOMATED COUNT: 3.6 X 10*6/UL (ref 4.1–5.2)
ERYTHROCYTE [DISTWIDTH] IN BLOOD: 14.6 % (ref 11.5–14.5)
GLUCOSE BLD-MCNC: 119 MG/DL (ref 70–110)
GLUCOSE BLD-MCNC: 123 MG/DL (ref 70–110)
GLUCOSE BLD-MCNC: 129 MG/DL (ref 70–110)
GLUCOSE BLD-MCNC: 189 MG/DL (ref 70–110)
HCT VFR BLD AUTO: 32.9 % (ref 37.2–46.3)
HGB BLD-MCNC: 10.6 G/DL (ref 12–15)
IMM GRANULOCYTES BLD QL AUTO: 0.5 %
LYMPHOCYTES # SPEC AUTO: 2.37 X 10*3/UL (ref 0.9–5)
LYMPHOCYTES NFR SPEC AUTO: 27.1 %
MCH RBC QN AUTO: 29.4 PG (ref 27–32)
MCHC RBC AUTO-ENTMCNC: 32.2 G/DL (ref 32–37)
MCV RBC AUTO: 91.4 FL (ref 80–97)
MONOCYTES # BLD AUTO: 1.04 X 10*3/UL (ref 0.2–1)
MONOCYTES NFR BLD AUTO: 11.9 %
NEUTROPHILS # BLD AUTO: 5.12 X 10*3/UL (ref 1.8–7.7)
NEUTROPHILS NFR BLD AUTO: 58.7 %
NRBC BLD AUTO-RTO: 0 X 10*3/UL (ref 0–0.01)
PLATELET # BLD AUTO: 184 X 10*3/UL (ref 140–440)
WBC # BLD AUTO: 8.73 X 10*3/UL (ref 4.5–10)

## 2024-10-19 RX ADMIN — DOXYCYCLINE SCH MG: 100 CAPSULE ORAL at 09:16

## 2024-10-19 NOTE — P.PN
Subjective


Progress Note Date: 10/19/24


Patient is evaluated in follow-up on the medical floor she is postoperative day 

#1 right hip total arthroplasty. Patient was noted to have episodes of hypoxia 

with saturations of 92 to 93% chest x-ray was completed which is negative for 

any acute process and patient states that she is using her incentive spirometer 

at the bedside.  Acute complaints at this time and is currently pending 

discharge to subacute rehab which will happen on Monday as she does require 3 

nights of inpatient hospital stay.





9/19/2024


Patient is postoperative day #2 right total hip arthroplasty.  She is not having

any cute complaints overnight.  Chest x-ray was completed with no acute 

findings.  Patient is on room air.  Wound VAC in place to the right thigh 

incision.  Patient is currently pending discharge to subacute rehab which will 

not be until Monday.





Review of Systems





Constitutional: Denied any fatigue denied any fever.


Cardio vascular: denied any chest pain, palpitations


Gastrointestinal: denied any nausea, vomiting, diarrhea


Pulmonary: Denied any shortness of breath cough


Neurologic denied any new focal deficits





All inpatient medications were reviewed and appropriate changes in these 

medications as dictated in the interval history and assessment and plan.





PHYSICAL EXAMINATION: 





GENERAL: The patient is alert and oriented x3, not in any acute distress. Well 

developed, well nourished. 


HEENT: Pupils are round and equally reacting to light. EOMI. No scleral icterus.

No conjunctival pallor. Normocephalic, atraumatic. No pharyngeal erythema. No 

thyromegaly. 


CARDIOVASCULAR: S1 and S2 present. No murmurs, rubs, or gallops. 


PULMONARY: Chest is clear to auscultation, no wheezing or crackles. 


ABDOMEN: Soft, nontender, nondistended, normoactive bowel sounds. No palpable 

organomegaly. 


MUSCULOSKELETAL: No joint swelling or deformity.


EXTREMITIES: No cyanosis, clubbing, or pedal edema. 


NEUROLOGICAL: Gross neurological examination did not reveal any focal deficits. 


SKIN: No rashes. 





Assessment


Postoperative day 2 right hip total arthroplasty


Hypertension


Diabetes mellitus type 2


Gastroesophageal reflux disease


Dyslipidemia


History of breast insert


GI prophylaxis


DVT prophylaxis as per primary


Full code





Continue all same home medications continue to encourage incentive spirometer 10

times an hour while awake.  Patient is currently pending discharge to subacute 

rehab as mentioned she does require 3 days of inpatient hospital stay and 

discharge will be on Monday.





The impression and plan of care has been dictated by Rin Avelar Nurse 

Practitioner as directed.





Dr. Chapito MD


I have performed a history and physical examination and medical decision making 

of this patient, discussed the same with the dictator, and agree with the 

dictators assessment and plan as written, documented as a scribe. Based on total

visit time, I have performed more than 50% of this visit.








Objective





- Vital Signs


Vital signs: 


                                   Vital Signs











Temp  99.7 F H  10/19/24 07:23


 


Pulse  89   10/19/24 07:23


 


Resp  17   10/19/24 07:23


 


BP  131/76   10/19/24 07:23


 


Pulse Ox  95   10/19/24 07:23


 


FiO2      








                                 Intake & Output











 10/18/24 10/19/24 10/19/24





 18:59 06:59 18:59


 


Other:   


 


  Voiding Method Toilet Toilet 


 


  # Voids 1 2 














- Labs


CBC & Chem 7: 


                                 10/19/24 02:28





                                 10/16/24 11:35


Labs: 


                  Abnormal Lab Results - Last 24 Hours (Table)











  10/18/24 10/18/24 10/18/24 Range/Units





  11:11 16:52 20:18 


 


POC Glucose (mg/dL)  146 H  241 H  208 H  ()  mg/dL














  10/19/24 Range/Units





  06:46 


 


POC Glucose (mg/dL)  119 H  ()  mg/dL














Assessment and Plan


Time with Patient: Less than 30

## 2024-10-19 NOTE — P.PN
Subjective





well although she is somewhat frustrated with her progress.  She states that she

has pain and burning in her right leg.  She also says she feels weak and is 

uncomfortable.  Acute events per nursing thinks the patient is doing well.





Objective





- Vital Signs


Vital signs: 


                                   Vital Signs











Temp  99.7 F H  10/19/24 07:23


 


Pulse  89   10/19/24 07:23


 


Resp  17   10/19/24 07:23


 


BP  131/76   10/19/24 07:23


 


Pulse Ox  95   10/19/24 07:23


 


FiO2      








                                 Intake & Output











 10/18/24 10/19/24 10/19/24





 18:59 06:59 18:59


 


Other:   


 


  Voiding Method Toilet Toilet 


 


  # Voids 1 2 














- Exam





he is sitting up at bedside in the chair.  She is alert and able to answer 

questions.  She is in no obvious distress.  There is an incisional wound VAC 

over the anterior aspect of the right hip with good seal.  Her thigh is soft.  

Femoral nerve function is intact as she is able to actively extend her knee.  

She is able to actively plantarflex and dorsiflex her ankle and her toes.





- Labs


CBC & Chem 7: 


                                 10/17/24 04:04





                                 10/16/24 11:35


Labs: 


                  Abnormal Lab Results - Last 24 Hours (Table)











  10/18/24 10/18/24 10/18/24 Range/Units





  11:11 16:52 20:18 


 


POC Glucose (mg/dL)  146 H  241 H  208 H  ()  mg/dL














  10/19/24 Range/Units





  06:46 


 


POC Glucose (mg/dL)  119 H  ()  mg/dL














Assessment and Plan


Assessment: 





postoperative day #3 status post right direct anterior total hip arthroplasty


Morbid obesity with BMI 45


Plan: 





Overall the patient is doing well.  I reassured her that she is improving and 

looks to be doing well.  We discussed the role that her morbid obesity place in 

her recovery.  She seems to understand this.  I'd recommend continued 

mobilization with physical therapy.  DVT prophylaxis with aspirin 81 mg twice a 

day.  Leave wound VAC in place.  We'll start her on doxycycline due to her o

besity for low-dose antibiotic suppression.  Plan on discharge to rehab on 

Monday.

## 2024-10-20 LAB
GLUCOSE BLD-MCNC: 129 MG/DL (ref 70–110)
GLUCOSE BLD-MCNC: 144 MG/DL (ref 70–110)
GLUCOSE BLD-MCNC: 144 MG/DL (ref 70–110)
GLUCOSE BLD-MCNC: 165 MG/DL (ref 70–110)

## 2024-10-20 RX ADMIN — NYSTATIN SCH APPLIC: 100000 CREAM TOPICAL at 18:49

## 2024-10-20 NOTE — US
EXAMINATION TYPE: US venous doppler duplex LE RT

 

DATE OF EXAM: 10/20/2024 1:09 PM

 

Exam done portable

 

COMPARISON: NONE

 

CLINICAL INDICATION: Female, 74 years old with history of right leg pain, swelling;

 

TECHNIQUE:  The lower extremity deep venous system is examined utilizing real time linear array sonog
carli with graded compression, color doppler sonography, and spectral doppler.

 

SIDE PERFORMED: Right  

 

FINDINGS:

 

VESSELS IMAGED:

Common Femoral Vein

Deep Femoral Vein

Greater Saphenous Vein *

Femoral Vein

Popliteal Vein

Small Saphenous Vein *

Proximal Calf Veins

(* superficial vessels)

 

 

 

Right Leg:  Appears negative for DVT

 

 

Grayscale, color doppler, spectral doppler imaging performed of the deep veins of the lower extremiti
es.  

 

IMPRESSION: 

No evidence of deep vein thrombosis of the right lower extremity.

 

 

 

[facility], Workstation: [id number], 10/20/2024 3:59 PM

## 2024-10-20 NOTE — P.PN
Subjective





no acute events.  The patient continues to complain of pain in her right hip and

leg but it is improving slowly.





Objective





- Vital Signs


Vital signs: 


                                   Vital Signs











Temp  99.5 F   10/20/24 00:54


 


Pulse  88   10/20/24 00:54


 


Resp  16   10/20/24 00:54


 


BP  113/67   10/20/24 00:54


 


Pulse Ox  96   10/20/24 00:54


 


FiO2      








                                 Intake & Output











 10/19/24 10/20/24 10/20/24





 18:59 06:59 18:59


 


Output Total 200  


 


Balance -200  


 


Output:   


 


  Urine 200  


 


Other:   


 


  Voiding Method Toilet  


 


  # Voids  2 


 


  # Bowel Movements  1 














- Exam





patient is sitting up at bedside.  She is alert and able to answer questions.  A

dressing over her hip is intact.  Femoral nerve function is intact.  She is able

to actively plantar flex and dorsiflex her ankle and her toes.





- Labs


CBC & Chem 7: 


                                 10/19/24 02:28





                                 10/16/24 11:35


Labs: 


                  Abnormal Lab Results - Last 24 Hours (Table)











  10/19/24 10/19/24 10/19/24 Range/Units





  02:28 11:50 16:49 


 


RBC  3.60 L    (4.10-5.20)  X 10*6/uL


 


Hgb  10.6 L    (12.0-15.0)  g/dL


 


Hct  32.9 L    (37.2-46.3)  %


 


RDW  14.6 H    (11.5-14.5)  %


 


Monocytes #  1.04 H    (0.20-1.00)  X 10*3/uL


 


POC Glucose (mg/dL)   123 H  129 H  ()  mg/dL














  10/19/24 10/20/24 Range/Units





  20:17 06:55 


 


RBC    (4.10-5.20)  X 10*6/uL


 


Hgb    (12.0-15.0)  g/dL


 


Hct    (37.2-46.3)  %


 


RDW    (11.5-14.5)  %


 


Monocytes #    (0.20-1.00)  X 10*3/uL


 


POC Glucose (mg/dL)  189 H  129 H  ()  mg/dL














Assessment and Plan


Assessment: 





postoperative day #4 status post right direct anterior total hip arthroplasty, 

morbid obesity


Plan: 





continue treatment as outlined.  Continue to mobilize out of bed into a chair.  

Plan for discharge to subacute rehab tomorrow.

## 2024-10-21 VITALS — TEMPERATURE: 97.6 F | DIASTOLIC BLOOD PRESSURE: 76 MMHG | SYSTOLIC BLOOD PRESSURE: 142 MMHG | RESPIRATION RATE: 18 BRPM

## 2024-10-21 VITALS — HEART RATE: 75 BPM

## 2024-10-21 LAB
ANION GAP SERPL CALC-SCNC: 20.2 MMOL/L (ref 4–12)
BASOPHILS # BLD AUTO: 0.08 X 10*3/UL (ref 0–0.1)
BASOPHILS NFR BLD AUTO: 0.8 %
BUN SERPL-SCNC: 20.6 MG/DL (ref 9–27)
BUN/CREAT SERPL: 25.75 RATIO (ref 12–20)
CALCIUM SPEC-MCNC: 8.9 MG/DL (ref 8.7–10.3)
CHLORIDE SERPL-SCNC: 100 MMOL/L (ref 96–109)
CO2 SERPL-SCNC: 17.8 MMOL/L (ref 21.6–31.8)
EOSINOPHIL # BLD AUTO: 0.19 X 10*3/UL (ref 0.04–0.35)
EOSINOPHIL NFR BLD AUTO: 1.8 %
ERYTHROCYTE [DISTWIDTH] IN BLOOD BY AUTOMATED COUNT: 3.96 X 10*6/UL (ref 4.1–5.2)
ERYTHROCYTE [DISTWIDTH] IN BLOOD: 14.6 % (ref 11.5–14.5)
GLUCOSE BLD-MCNC: 110 MG/DL (ref 70–110)
GLUCOSE BLD-MCNC: 151 MG/DL (ref 70–110)
GLUCOSE SERPL-MCNC: 165 MG/DL (ref 70–110)
HCT VFR BLD AUTO: 35.8 % (ref 37.2–46.3)
HGB BLD-MCNC: 11.6 G/DL (ref 12–15)
IMM GRANULOCYTES BLD QL AUTO: 0.6 %
LYMPHOCYTES # SPEC AUTO: 2.77 X 10*3/UL (ref 0.9–5)
LYMPHOCYTES NFR SPEC AUTO: 26.2 %
MCH RBC QN AUTO: 29.3 PG (ref 27–32)
MCHC RBC AUTO-ENTMCNC: 32.4 G/DL (ref 32–37)
MCV RBC AUTO: 90.4 FL (ref 80–97)
MONOCYTES # BLD AUTO: 1.11 X 10*3/UL (ref 0.2–1)
MONOCYTES NFR BLD AUTO: 10.5 %
NEUTROPHILS # BLD AUTO: 6.37 X 10*3/UL (ref 1.8–7.7)
NEUTROPHILS NFR BLD AUTO: 60.1 %
NRBC BLD AUTO-RTO: 0 X 10*3/UL (ref 0–0.01)
PLATELET # BLD AUTO: 263 X 10*3/UL (ref 140–440)
POTASSIUM SERPL-SCNC: 3.8 MMOL/L (ref 3.5–5.5)
SODIUM SERPL-SCNC: 138 MMOL/L (ref 135–145)
WBC # BLD AUTO: 10.58 X 10*3/UL (ref 4.5–10)

## 2024-10-21 NOTE — P.PN
Subjective


Progress Note Date: 10/21/24


Principal diagnosis: 


Status post right direct anterior total hip arthroplasty for right hip 

osteoarthritis on 10/16/2024 by Dr. Dunne





No acute events overnight.  Patient was examined at bedside this morning.  

Patient states they have mild pain in the right thigh.  Patient has been up to 

the bathroom multiple times, and up to the chair.    








Objective





- Vital Signs


Vital signs: 


                                   Vital Signs











Temp  97.6 F   10/21/24 07:22


 


Pulse  75   10/21/24 07:22


 


Resp  18   10/21/24 07:22


 


BP  142/76   10/21/24 07:22


 


Pulse Ox  94 L  10/21/24 07:22


 


FiO2      








                                 Intake & Output











 10/20/24 10/21/24 10/21/24





 18:59 06:59 18:59


 


Other:   


 


  Voiding Method Toilet  


 


  # Voids 3  














- Exam


Patient is resting comfortably in bed, in no apparent distress.  They are awake,

alert and able to answer questions.  On inspection the surgical hip has a wound 

VAC, it is intact, there is no drainage or strikethrough.  The skin surrounding 

the VAC is free of erythema.  There is mild swelling in the operative thigh.  

Operative femoral nerve function is intact.  The patient is able to actively 

plantarflex and dorsiflex their operative ankle and toes.  Their operative foot 

is pink and warm to touch.








- Labs


CBC & Chem 7: 


                                 10/19/24 02:28





                                 10/16/24 11:35


Labs: 


                  Abnormal Lab Results - Last 24 Hours (Table)











  10/20/24 10/20/24 10/20/24 Range/Units





  11:43 16:40 20:55 


 


POC Glucose (mg/dL)  165 H  144 H  144 H  ()  mg/dL














Assessment and Plan


Assessment: 


Postop day #5 status post right direct anterior total hip arthroplasty for right

hip osteoarthritis on 10/16/2024 by Dr. Dunne





Plan: 


Weight-bear as tolerated with walker on the operative extremity.


Leave surgical wound VAC. 


Patient's pain has been controlled with oral Norco.  


We appreciate medicine for medical management.


Plan discharge to short acute rehab facility later today.

## 2024-10-21 NOTE — P.DS
Providers


Date of admission: 


10/17/24 10:29





Attending physician: 


Frank Dunne





Consults: 





                                        





10/16/24 16:26


Consult Physician Routine 


   Consulting Provider: Chris Merchant


   Consult Reason/Comments: medical management


   Do you want consulting provider notified?: Yes











Primary care physician: 


Yoselyn Tarango MD





Hospital Course: 


On 10/16/2024 patient presented to preop for scheduled right total hip 

arthroplasty for severe right hip osteoarthritis that failed conservative 

management for over 6 months.  Patient tolerated the procedure well.  Patient 

was tolerated to the orthopedic floor.  Patient worked with physical therapy.  

Patient had difficulty ambulating and continued to have difficulty ambulating on

their own due to lack of strength.  Patient was examined at bedside this 

morning, surgical dressing is done with the wound VAC that is intact.  Patient 

will be transferred to a short acute rehab facility.





Assessment: 


Status post right total hip arthroplasty on 10/16/2024 for severe right hip 

osteoarthritis by Dr. Dunne





Plan - Discharge Summary


Discharge Rx Participant: Yes


New Discharge Prescriptions: 


New


   Aspirin 81 mg PO BID #60 tab


   Docusate [Colace] 100 mg PO BID #60 capsule


   HYDROcodone/APAP 5-325MG [Norco 5] 1 - 2 each PO Q6HR PRN #48 tab


     PRN Reason: Pain


   Ondansetron [Zofran] 4 mg PO Q6HR PRN #30 tab


     PRN Reason: Nausea


   Doxycycline Monohydrate 100 mg PO BID #28 cap


   Diclofenac Sodium [Voltaren] 75 mg PO BID #60 tab





No Action


   Timolol (Unknown Dose) 1 drop BOTH EYES BID


   amLODIPine BESYLATE 10 mg PO HS


   Losartan Potassium [Cozaar] 100 mg PO QAM


   Cinnamon Bark [Cinnamon] 1,000 mg PO DAILY


   Acetaminophen [Tylenol Arthritis] 650 - 1,300 mg PO AS DIRECTED PRN


     PRN Reason: Pain


   Ascorbic Acid [Vitamin C] 1,000 mg PO DAILY


   Multivitamins, Thera [Multivitamin (formulary)] 1 tab PO DAILY


   Ferrous Sulfate [Feosol] 325 mg PO DAILY


   Potassium Chloride ER [K-Dur 10] 10 meq PO Q48H


   Cyanocobalamin [Vitamin B-12] 500 mcg PO DAILY


   Cholecalciferol (Vitamin D3) [Vitamin D3 (125 MCG = 5,000 IU)] 125 mcg PO 

DAILY


   glipiZIDE [Glucotrol] 10 mg PO BID


   metFORMIN HCL 1,000 mg PO BID


   metFORMIN  mg PO 1200


   hydroCHLOROthiazide 25 mg PO QAM


   Ubidecarenone [Co Q-10] 100 mg PO DAILY


   Semaglutide [Ozempic] 1 mg SQ WE


   Omeprazole 20 mg PO BID


   Magnesium 400 mg PO HS


   Calcium Carbonate/Vitamin D3 [Calcium 600-Vit D3 10 mcg (400 Iu)] 0.5 each PO

Q72H


   Atorvastatin Calcium 20 mg PO HS


   Aspirin [Adult Low Dose Aspirin EC] 81 mg PO HS


   Loperamide [Imodium] 2 mg PO AS DIRECTED PRN


     PRN Reason: Diarrhea


Discharge Medication List





Acetaminophen [Tylenol Arthritis] 650 - 1,300 mg PO AS DIRECTED PRN 10/10/24 

[History]


Ascorbic Acid [Vitamin C] 1,000 mg PO DAILY 10/10/24 [History]


Aspirin [Adult Low Dose Aspirin EC] 81 mg PO HS 10/10/24 [History]


Atorvastatin Calcium 20 mg PO HS 10/10/24 [History]


Calcium Carbonate/Vitamin D3 [Calcium 600-Vit D3 10 mcg (400 Iu)] 0.5 each PO 

Q72H 10/10/24 [History]


Cholecalciferol (Vitamin D3) [Vitamin D3 (125 MCG = 5,000 IU)] 125 mcg PO DAILY 

10/10/24 [History]


Cinnamon Bark [Cinnamon] 1,000 mg PO DAILY 10/10/24 [History]


Cyanocobalamin [Vitamin B-12] 500 mcg PO DAILY 10/10/24 [History]


Ferrous Sulfate [Feosol] 325 mg PO DAILY 10/10/24 [History]


Loperamide [Imodium] 2 mg PO AS DIRECTED PRN 10/10/24 [History]


Losartan Potassium [Cozaar] 100 mg PO QAM 10/10/24 [History]


Magnesium 400 mg PO HS 10/10/24 [History]


Multivitamins, Thera [Multivitamin (formulary)] 1 tab PO DAILY 10/10/24 

[History]


Omeprazole 20 mg PO BID 10/10/24 [History]


Potassium Chloride ER [K-Dur 10] 10 meq PO Q48H 10/10/24 [History]


Semaglutide [Ozempic] 1 mg SQ WE 10/10/24 [History]


Timolol (Unknown Dose) 1 drop BOTH EYES BID 10/10/24 [History]


Ubidecarenone [Co Q-10] 100 mg PO DAILY 10/10/24 [History]


amLODIPine BESYLATE 10 mg PO HS 10/10/24 [History]


glipiZIDE [Glucotrol] 10 mg PO BID 10/10/24 [History]


hydroCHLOROthiazide 25 mg PO QAM 10/10/24 [History]


metFORMIN HCL 1,000 mg PO BID 10/10/24 [History]


metFORMIN  mg PO 1200 10/10/24 [History]


Aspirin 81 mg PO BID #60 tab 10/16/24 [Rx]


Diclofenac Sodium [Voltaren] 75 mg PO BID #60 tab 10/16/24 [Rx]


Docusate [Colace] 100 mg PO BID #60 capsule 10/16/24 [Rx]


Doxycycline Monohydrate 100 mg PO BID #28 cap 10/16/24 [Rx]


HYDROcodone/APAP 5-325MG [Norco 5] 1 - 2 each PO Q6HR PRN #48 tab 10/16/24 [Rx]


Ondansetron [Zofran] 4 mg PO Q6HR PRN #30 tab 10/16/24 [Rx]








Follow up Appointment(s)/Referral(s): 


[facility] - As Needed


Frank Dunne MD [Medical Doctor] - 1 Week


Activity/Diet/Wound Care/Special Instructions: 


[facility] swing bed





1.  Weight-bear as tolerated on your operative extremity unless instructed 

otherwise.  Use a walker or other assistive device to ambulate.


2.  Leave surgical dressing in place.  If your dressing becomes saturated with 

blood, there is drainage, or the dressing becomes loose please contact the 

office.


3.  It is okay to shower with your surgical dressing, but do not submerge in 

water (no hot tubs, bath's,  swimming etc.)


4.  Take your blood clot prevention medication as prescribed (aspirin, Eliquis, 

Xarelto, and Plavix are commonly prescribed medications for blood clot 

prevention)


5.  While taking Norco or Percocet for pain take a stool softener (Ex: Colace) 

and drink lots of water.


6.  Keep all follow-up appointments as scheduled.  You will usually be seen in 1

-2 weeks following surgery. 


7.  Please contact the office with any questions or concerns [phone]


Discharge Disposition: TRANSFER TO SNF/ECF

## 2024-10-21 NOTE — P.PN
Subjective


Progress Note Date: 10/20/24











Patient is evaluated in follow-up on the medical floor she is postoperative day 

#1 right hip total arthroplasty. Patient was noted to have episodes of hypoxia 

with saturations of 92 to 93% chest x-ray was completed which is negative for 

any acute process and patient states that she is using her incentive spirometer 

at the bedside.  Acute complaints at this time and is currently pending 

discharge to subacute rehab which will happen on Monday as she does require 3 

nights of inpatient hospital stay.





10/19/2024


Patient is postoperative day #2 right total hip arthroplasty.  She is not having

any cute complaints overnight.  Chest x-ray was completed with no acute 

findings.  Patient is on room air.  Wound VAC in place to the right thigh 

incision.  Patient is currently pending discharge to subacute rehab which will 

not be until Monday.





10/20/2024


Patient is seen in follow-up today status post right total hip arthroplasty.  

Patient having significant weakness and pain in patient reports swelling in the 

right lower extremity.  Patient is afebrile with no reports of chest pain or 

shortness of breath.  Patient does report significant irritation in her groin 

folds and will add nystatin cream.  According to Medicare guidelines, patient 

requires 3 night hospitalization.  Plan is for discharge to [facility] for continued 

strength and mobility on Monday.





Review of Systems





Constitutional: Denied any fatigue denied any fever.


Cardio vascular: denied any chest pain, palpitations


Gastrointestinal: denied any nausea, vomiting, diarrhea


Pulmonary: Denied any shortness of breath cough


Neurologic denied any new focal deficits, reports of being weak with continued 

right hip pain





All inpatient medications were reviewed and appropriate changes in these 

medications as dictated in the interval history and assessment and plan.





PHYSICAL EXAMINATION: 





GENERAL: The patient is alert and oriented x3, not in any acute distress. Well 

developed, well nourished.  Elderly appearing, morbidly obese


HEENT: Pupils are round and equally reacting to light. EOMI. No scleral icterus.

No conjunctival pallor. Normocephalic, atraumatic. No pharyngeal erythema. No 

thyromegaly. 


CARDIOVASCULAR: S1 and S2 present. No murmurs, rubs, or gallops. 


PULMONARY: Chest is clear to auscultation, no wheezing or crackles. 


ABDOMEN: Soft, nontender, nondistended, normoactive bowel sounds. No palpable 

organomegaly. 


MUSCULOSKELETAL: No joint swelling or deformity.


EXTREMITIES: No cyanosis, clubbing, or pedal edema.  Right surgical hip dressing

is dry and intact


NEUROLOGICAL: Gross neurological examination did not reveal any focal deficits. 

Diffusely weak


SKIN: No rashes. 





Assessment:





Postoperative day 2 right hip total arthroplasty


Hypertension


Diabetes mellitus type 2


Gastroesophageal reflux disease


Dyslipidemia


History of breast cancer


Morbid obesity with a BMI of 44.8


GI prophylaxis


DVT prophylaxis as per primary


Full code





Plan:





Continue all same home medications continue to encourage incentive spirometer 10

times an hour while awake.  Patient is currently pending discharge to subacute 

rehab as mentioned she does require 3 days of inpatient hospital stay and 

discharge will be on Monday.


Patient reporting some right hip pain and swelling, venous Doppler was negative 

for DVT


Patient also reporting some irritation in her groin folds and will add nystatin 

cream


Patient is medically stable once cleared by orthopedics for discharge to [facility].


We will continue to follow with orthopedics during hospitalization.  Thank you 

kindly for this consultation.





The impression and plan of care has been dictated by Radha Garcia, Nurse 

Practitioner as directed.





Dr. Chapito MD


I have performed a history and physical examination and medical decision making 

of this patient, discussed the same with the dictator, and agree with the 

dictators assessment and plan as written, documented as a scribe. Based on total

visit time, I have performed more than 50% of this visit.








Objective





- Vital Signs


Vital signs: 


                                   Vital Signs











Temp  98.3 F   10/20/24 07:32


 


Pulse  84   10/20/24 07:32


 


Resp  17   10/20/24 07:32


 


BP  133/72   10/20/24 07:32


 


Pulse Ox  93 L  10/20/24 07:32


 


FiO2      








                                 Intake & Output











 10/19/24 10/20/24 10/20/24





 18:59 06:59 18:59


 


Output Total 200  


 


Balance -200  


 


Output:   


 


  Urine 200  


 


Other:   


 


  Voiding Method Toilet  


 


  # Voids  2 


 


  # Bowel Movements  1 














- Labs


CBC & Chem 7: 


                                 10/19/24 02:28





                                 10/16/24 11:35


Labs: 


                  Abnormal Lab Results - Last 24 Hours (Table)











  10/19/24 10/19/24 10/19/24 Range/Units





  02:28 11:50 16:49 


 


RBC  3.60 L    (4.10-5.20)  X 10*6/uL


 


Hgb  10.6 L    (12.0-15.0)  g/dL


 


Hct  32.9 L    (37.2-46.3)  %


 


RDW  14.6 H    (11.5-14.5)  %


 


Monocytes #  1.04 H    (0.20-1.00)  X 10*3/uL


 


POC Glucose (mg/dL)   123 H  129 H  ()  mg/dL














  10/19/24 10/20/24 Range/Units





  20:17 06:55 


 


RBC    (4.10-5.20)  X 10*6/uL


 


Hgb    (12.0-15.0)  g/dL


 


Hct    (37.2-46.3)  %


 


RDW    (11.5-14.5)  %


 


Monocytes #    (0.20-1.00)  X 10*3/uL


 


POC Glucose (mg/dL)  189 H  129 H  ()  mg/dL

## 2024-10-22 NOTE — PN
PROGRESS NOTE



A 74-year-old white female, status post osteoarthritis of right hip.  Med rec was

completed.  Home medicines were adjusted.  She is going to a rehab center status post

right hip surgery.  Continue with the current blood pressure medications, diuretics,

diabetic medicines.  Follow up later on.  Prognosis guarded.  Continue current

treatment.





MMODL / IJN: [id number] / Job#: [id number]

## 2025-05-07 ENCOUNTER — APPOINTMENT (OUTPATIENT)
Dept: URBAN - NONMETROPOLITAN AREA CLINIC 51 | Age: 75
Setting detail: DERMATOLOGY
End: 2025-05-07

## 2025-05-07 DIAGNOSIS — L81.4 OTHER MELANIN HYPERPIGMENTATION: ICD-10-CM

## 2025-05-07 DIAGNOSIS — D18.0 HEMANGIOMA: ICD-10-CM

## 2025-05-07 DIAGNOSIS — L72.0 EPIDERMAL CYST: ICD-10-CM

## 2025-05-07 DIAGNOSIS — L57.8 OTHER SKIN CHANGES DUE TO CHRONIC EXPOSURE TO NONIONIZING RADIATION: ICD-10-CM

## 2025-05-07 DIAGNOSIS — B35.1 TINEA UNGUIUM: ICD-10-CM

## 2025-05-07 DIAGNOSIS — L57.0 ACTINIC KERATOSIS: ICD-10-CM

## 2025-05-07 DIAGNOSIS — L82.1 OTHER SEBORRHEIC KERATOSIS: ICD-10-CM

## 2025-05-07 PROBLEM — D18.01 HEMANGIOMA OF SKIN AND SUBCUTANEOUS TISSUE: Status: ACTIVE | Noted: 2025-05-07

## 2025-05-07 PROCEDURE — OTHER MIPS QUALITY: OTHER

## 2025-05-07 PROCEDURE — OTHER PRESCRIPTION MEDICATION MANAGEMENT: OTHER

## 2025-05-07 PROCEDURE — OTHER PRESCRIPTION: OTHER

## 2025-05-07 PROCEDURE — OTHER ADDITIONAL NOTES: OTHER

## 2025-05-07 PROCEDURE — OTHER COUNSELING: OTHER

## 2025-05-07 PROCEDURE — OTHER LIQUID NITROGEN: OTHER

## 2025-05-07 PROCEDURE — OTHER SUNSCREEN RECOMMENDATIONS: OTHER

## 2025-05-07 PROCEDURE — 17000 DESTRUCT PREMALG LESION: CPT

## 2025-05-07 PROCEDURE — 99213 OFFICE O/P EST LOW 20 MIN: CPT | Mod: 25

## 2025-05-07 RX ORDER — CICLOPIROX OLAMINE 7.7 MG/100ML
SUSPENSION TOPICAL
Qty: 60 | Refills: 2 | Status: ERX | COMMUNITY
Start: 2025-05-07

## 2025-05-07 ASSESSMENT — LOCATION DETAILED DESCRIPTION DERM
LOCATION DETAILED: RIGHT MEDIAL SUPERIOR EYELID
LOCATION DETAILED: RIGHT SUPERIOR MEDIAL MIDBACK
LOCATION DETAILED: LEFT MEDIAL SUPERIOR CHEST
LOCATION DETAILED: LEFT DISTAL POSTERIOR UPPER ARM
LOCATION DETAILED: EPIGASTRIC SKIN
LOCATION DETAILED: LEFT PROXIMAL DORSAL FOREARM
LOCATION DETAILED: RIGHT PROXIMAL DORSAL FOREARM
LOCATION DETAILED: LEFT INFERIOR CENTRAL MALAR CHEEK
LOCATION DETAILED: LEFT INFERIOR FOREHEAD
LOCATION DETAILED: LEFT MEDIAL SUPERIOR EYELID
LOCATION DETAILED: RIGHT MEDIAL SUPERIOR CHEST
LOCATION DETAILED: LEFT DORSAL GREAT TOE
LOCATION DETAILED: RIGHT DISTAL POSTERIOR UPPER ARM
LOCATION DETAILED: LEFT ULNAR DORSAL HAND
LOCATION DETAILED: RIGHT GREAT TOENAIL

## 2025-05-07 ASSESSMENT — LOCATION SIMPLE DESCRIPTION DERM
LOCATION SIMPLE: RIGHT GREAT TOE
LOCATION SIMPLE: LEFT GREAT TOE
LOCATION SIMPLE: RIGHT FOREARM
LOCATION SIMPLE: LEFT HAND
LOCATION SIMPLE: LEFT SUPERIOR EYELID
LOCATION SIMPLE: LEFT FOREARM
LOCATION SIMPLE: LEFT FOREHEAD
LOCATION SIMPLE: LEFT POSTERIOR UPPER ARM
LOCATION SIMPLE: CHEST
LOCATION SIMPLE: RIGHT SUPERIOR EYELID
LOCATION SIMPLE: RIGHT POSTERIOR UPPER ARM
LOCATION SIMPLE: LEFT CHEEK
LOCATION SIMPLE: ABDOMEN
LOCATION SIMPLE: RIGHT LOWER BACK

## 2025-05-07 ASSESSMENT — LOCATION ZONE DERM
LOCATION ZONE: TOE
LOCATION ZONE: TRUNK
LOCATION ZONE: ARM
LOCATION ZONE: FACE
LOCATION ZONE: TOENAIL
LOCATION ZONE: EYELID
LOCATION ZONE: HAND